# Patient Record
Sex: MALE | Race: WHITE | NOT HISPANIC OR LATINO | URBAN - METROPOLITAN AREA
[De-identification: names, ages, dates, MRNs, and addresses within clinical notes are randomized per-mention and may not be internally consistent; named-entity substitution may affect disease eponyms.]

---

## 2021-09-15 ENCOUNTER — NURSING HOME VISIT (OUTPATIENT)
Dept: GERIATRICS | Facility: OTHER | Age: 86
End: 2021-09-15
Payer: COMMERCIAL

## 2021-09-15 ENCOUNTER — TELEPHONE (OUTPATIENT)
Dept: GERIATRICS | Age: 86
End: 2021-09-15

## 2021-09-15 DIAGNOSIS — I50.22 CHRONIC SYSTOLIC HEART FAILURE (HCC): ICD-10-CM
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
## 2021-09-15 DIAGNOSIS — I10 ESSENTIAL HYPERTENSION: ICD-10-CM

## 2021-09-15 DIAGNOSIS — S22.31XD FRACTURE OF ONE RIB, RIGHT SIDE, SUBSEQUENT ENCOUNTER FOR FRACTURE WITH ROUTINE HEALING: ICD-10-CM

## 2021-09-15 DIAGNOSIS — S32.009D CLOSED FRACTURE OF LUMBAR VERTEBRA WITH ROUTINE HEALING, UNSPECIFIED FRACTURE MORPHOLOGY, UNSPECIFIED LUMBAR VERTEBRAL LEVEL, SUBSEQUENT ENCOUNTER: ICD-10-CM

## 2021-09-15 DIAGNOSIS — N18.6 ESRD (END STAGE RENAL DISEASE) (HCC): ICD-10-CM

## 2021-09-15 PROCEDURE — 99306 1ST NF CARE HIGH MDM 50: CPT | Performed by: FAMILY MEDICINE

## 2021-09-15 NOTE — TELEPHONE ENCOUNTER
Monserrat Mcclain, LPN at [address] [place] [address] was asked to call Provider to inquire why patient has been prescribed Keflex  Please call LPN at [phone]

## 2021-09-16 NOTE — PROGRESS NOTES
[address]  [address] [facility] 27 [facility], 326 [facility] 31  History and Physical    NAME: Petra Keyes  AGE: 81 y o  SEX: male [phone]    DATE OF ENCOUNTER: 9/15/2021    Code status:  No CPR    Assessment and Plan     1  Fall, subsequent encounter     -  PT/OT ordered     -  Fall precautions in place    2  Closed fracture of lumbar vertebra with routine healing, unspecified fracture morphology, unspecified lumbar vertebral level, subsequent encounter     -  Continue with TLSO brace     -  Continue current pain meds    3  Fracture of one rib, right side, subsequent encounter for fracture with routine healing     -  Continue current pain meds     -  Incentive spirometry recommended    4  ESRD (end stage renal disease) ([facility] )     - on T-Th-S     -  Continue sevelamer 800 mg 3 times a day     -  Continue midodrine 10 mg on dialysis days as needed    5  Essential hypertension     -  Continue isosorbide mononitrate 30 mg p o  daily    6  Chronic systolic heart failure (HCC)     -  Continue carvedilol 3 125 mg p o  b i d      -  Continue isosorbide mononitrate 30 mg p o  daily      All medications and routine orders were reviewed and updated as needed  Plan discussed with:  Patient and staff    Chief Complaint     Seen for admission at [address] [place] [address]    History of Present Illness     Harriet Álvarez, a 82 y/o male got admitted to the hospital after a fall at home and unable to walk  CT head was negative for acute findings, CT pelvis showed minimally displaced left iliac bone fracture, acute mildly displaced left posterior 10th rib fracture and 11th and 12th vertebral fracture  Ortho was consulted, recommended nonweightbearing for pelvic fractures, incentive spirometry and pain control for the rib fracture, and back brace for vertebral fracture  he is on hemodialysis on Tuesday Thursday and Saturday  he was seen and examined at bedside, stable    He is able to give good history, denies any back pain when he is lying in the bed  He is eating well and sleeping well    staff have no concerns at this time  HISTORY:  No past medical history on file  No family history on file  Social History     Socioeconomic History    Marital status:      Spouse name: Not on file    Number of children: Not on file    Years of education: Not on file    Highest education level: Not on file   Occupational History    Not on file   Tobacco Use    Smoking status: Not on file   Substance and Sexual Activity    Alcohol use: Not on file    Drug use: Not on file    Sexual activity: Not on file   Other Topics Concern    Not on file   Social History Narrative    Not on file     Social Determinants of Health     Financial Resource Strain:     Difficulty of Paying Living Expenses:    Food Insecurity:     Worried About Running Out of Food in the Last Year:     [address] in the Last Year:    Transportation Needs:     Lack of Transportation (Medical):  Lack of Transportation (Non-Medical):    Physical Activity:     Days of Exercise per Week:     Minutes of Exercise per Session:    Stress:     Feeling of Stress :    Social Connections:     Frequency of Communication with Friends and Family:     Frequency of Social Gatherings with Friends and Family:     Attends [employer] Services:     Active Member of Clubs or Organizations:     Attends Club or Organization Meetings:     Marital Status:    Intimate Partner Violence:     Fear of Current or Ex-Partner:     Emotionally Abused:     Physically Abused:     Sexually Abused: Allergies:  Not on File    Review of Systems     Review of Systems   Constitutional: Positive for activity change and fatigue  HENT: Positive for hearing loss  Negative for dental problem and trouble swallowing  Eyes: Negative  Respiratory: Negative  Cardiovascular: Negative  Gastrointestinal: Negative  Genitourinary: Negative  Musculoskeletal: Positive for arthralgias, back pain and gait problem  Neurological: Positive for weakness  Psychiatric/Behavioral: Negative  All other systems reviewed and are negative  As in HPI  Medications and orders     All medications reviewed and updated in Nursing Home EMR  Objective     Vitals: T: 97 3, P: 71, R: 16, BP: 118/79, 94% on RA, Wt: 162 1 lbs    Physical Exam  Vitals and nursing note reviewed  Constitutional:       General: He is not in acute distress  Appearance: Normal appearance  He is well-developed  He is not diaphoretic  HENT:      Head: Normocephalic and atraumatic  Nose: Nose normal       Mouth/Throat:      Mouth: Mucous membranes are moist       Pharynx: Oropharynx is clear  No oropharyngeal exudate  Eyes:      General: No scleral icterus  Right eye: No discharge  Left eye: No discharge  Extraocular Movements: Extraocular movements intact  Conjunctiva/sclera: Conjunctivae normal    Cardiovascular:      Rate and Rhythm: Normal rate and regular rhythm  Heart sounds: Normal heart sounds  No murmur heard  Pulmonary:      Effort: Pulmonary effort is normal  No respiratory distress  Breath sounds: Normal breath sounds  No wheezing  Chest:      Chest wall: No tenderness  Abdominal:      General: Bowel sounds are normal  There is no distension  Palpations: Abdomen is soft  Tenderness: There is no abdominal tenderness  There is no guarding  Musculoskeletal:         General: No tenderness or deformity  Cervical back: Normal range of motion and neck supple  Right lower leg: No edema  Left lower leg: No edema  Comments: Impaired range motion in b/l lower legs due to back pain   Skin:     General: Skin is warm and dry  Neurological:      Mental Status: He is alert  Cranial Nerves: No cranial nerve deficit  Motor: No abnormal muscle tone        Coordination: Coordination normal  Comments: Oriented to self  pleasant, able to follow simple commands  Psychiatric:         Behavior: Behavior normal          Pertinent Laboratory/Diagnostic Studies: The following labs/studies were reviewed please see chart or hospital paperwork for details  Refer to facility chart      - Counseling Documentation: patient was counseled regarding: prognosis

## 2021-09-18 ENCOUNTER — TELEPHONE (OUTPATIENT)
Dept: OTHER | Facility: OTHER | Age: 86
End: 2021-09-18

## 2021-09-18 NOTE — TELEPHONE ENCOUNTER
Malia Hernandez from [address] calling to page a message to on call provider  Patient was stable and went to dialysis this morning  At the dialysis center, there was an event and [facility]  Pt is currently admitted for a stroke  Paged on call provider

## 2021-09-20 PROBLEM — S22.31XD FRACTURE OF ONE RIB, RIGHT SIDE, SUBSEQUENT ENCOUNTER FOR FRACTURE WITH ROUTINE HEALING: Status: ACTIVE | Noted: 2021-09-20

## 2021-09-20 PROBLEM — S32.009D CLOSED FRACTURE OF LUMBAR VERTEBRA WITH ROUTINE HEALING: Status: ACTIVE | Noted: 2021-09-20

## 2021-09-20 PROBLEM — I50.22 CHRONIC SYSTOLIC HEART FAILURE (HCC): Status: ACTIVE | Noted: 2021-09-20

## 2021-09-20 PROBLEM — I10 ESSENTIAL HYPERTENSION: Status: ACTIVE | Noted: 2021-09-20
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
## 2021-09-20 PROBLEM — N18.6 ESRD (END STAGE RENAL DISEASE) (HCC): Status: ACTIVE | Noted: 2021-09-20
